# Patient Record
Sex: FEMALE | Race: WHITE | NOT HISPANIC OR LATINO
[De-identification: names, ages, dates, MRNs, and addresses within clinical notes are randomized per-mention and may not be internally consistent; named-entity substitution may affect disease eponyms.]

---

## 2017-11-20 ENCOUNTER — RESULT REVIEW (OUTPATIENT)
Age: 62
End: 2017-11-20

## 2017-11-20 ENCOUNTER — APPOINTMENT (OUTPATIENT)
Dept: OBGYN | Facility: CLINIC | Age: 62
End: 2017-11-20
Payer: COMMERCIAL

## 2017-11-20 PROCEDURE — 99396 PREV VISIT EST AGE 40-64: CPT

## 2018-12-07 ENCOUNTER — RESULT REVIEW (OUTPATIENT)
Age: 63
End: 2018-12-07

## 2018-12-07 ENCOUNTER — APPOINTMENT (OUTPATIENT)
Dept: OBGYN | Facility: CLINIC | Age: 63
End: 2018-12-07
Payer: COMMERCIAL

## 2018-12-07 PROCEDURE — 99396 PREV VISIT EST AGE 40-64: CPT

## 2018-12-07 PROCEDURE — 99213 OFFICE O/P EST LOW 20 MIN: CPT | Mod: 25

## 2019-12-05 ENCOUNTER — APPOINTMENT (OUTPATIENT)
Dept: OBGYN | Facility: CLINIC | Age: 64
End: 2019-12-05
Payer: COMMERCIAL

## 2019-12-05 ENCOUNTER — RESULT REVIEW (OUTPATIENT)
Age: 64
End: 2019-12-05

## 2019-12-05 PROCEDURE — 99396 PREV VISIT EST AGE 40-64: CPT

## 2020-12-31 ENCOUNTER — RESULT REVIEW (OUTPATIENT)
Age: 65
End: 2020-12-31

## 2020-12-31 ENCOUNTER — APPOINTMENT (OUTPATIENT)
Dept: OBGYN | Facility: CLINIC | Age: 65
End: 2020-12-31
Payer: COMMERCIAL

## 2020-12-31 VITALS
DIASTOLIC BLOOD PRESSURE: 78 MMHG | BODY MASS INDEX: 23.3 KG/M2 | WEIGHT: 145 LBS | HEIGHT: 66 IN | SYSTOLIC BLOOD PRESSURE: 124 MMHG

## 2020-12-31 PROCEDURE — 99396 PREV VISIT EST AGE 40-64: CPT

## 2021-11-23 DIAGNOSIS — R92.2 INCONCLUSIVE MAMMOGRAM: ICD-10-CM

## 2021-11-29 ENCOUNTER — NON-APPOINTMENT (OUTPATIENT)
Age: 66
End: 2021-11-29

## 2021-11-29 DIAGNOSIS — Z86.59 PERSONAL HISTORY OF OTHER MENTAL AND BEHAVIORAL DISORDERS: ICD-10-CM

## 2021-11-29 DIAGNOSIS — Z78.9 OTHER SPECIFIED HEALTH STATUS: ICD-10-CM

## 2021-11-29 DIAGNOSIS — Z87.898 PERSONAL HISTORY OF OTHER SPECIFIED CONDITIONS: ICD-10-CM

## 2021-11-29 DIAGNOSIS — M85.80 OTHER SPECIFIED DISORDERS OF BONE DENSITY AND STRUCTURE, UNSPECIFIED SITE: ICD-10-CM

## 2021-11-29 DIAGNOSIS — Z87.19 PERSONAL HISTORY OF OTHER DISEASES OF THE DIGESTIVE SYSTEM: ICD-10-CM

## 2021-11-29 RX ORDER — ALPRAZOLAM 2 MG/1
TABLET ORAL
Refills: 0 | Status: ACTIVE | COMMUNITY

## 2022-02-18 ENCOUNTER — APPOINTMENT (OUTPATIENT)
Dept: OBGYN | Facility: CLINIC | Age: 67
End: 2022-02-18
Payer: MEDICARE

## 2022-02-18 VITALS
BODY MASS INDEX: 23.3 KG/M2 | DIASTOLIC BLOOD PRESSURE: 80 MMHG | WEIGHT: 145 LBS | HEIGHT: 66 IN | SYSTOLIC BLOOD PRESSURE: 120 MMHG

## 2022-02-18 PROCEDURE — G0101: CPT

## 2022-02-18 NOTE — PLAN
[FreeTextEntry1] : 67 y/o  post-menopausal LMP at age 51, annual exam\par \par Annual\par -pap done today\par -BMD f/u pcp for osteo screening\par -breast mammo/sono up to date\par -colon screening reviewed\par -f/u PCP for annual and appropriate immunizations\par -rto 1 year.

## 2022-02-18 NOTE — HISTORY OF PRESENT ILLNESS
[Patient reported PAP Smear was normal] : Patient reported PAP Smear was normal [FreeTextEntry1] : 65 y/o  post-menopausal LMP at age 51, presents today for an annual exam. \par \par ObHx:  1995 x1\par PMHx: osteopenia, anxiety, \par SHx: colonoscopy\par \par \par Mammo : BIRADS 2\par Sono : BIRADS 2\par Pap 2020: normal [Mammogramdate] : 1/22 [TextBox_19] : BIRADS 2 [BreastSonogramDate] : 1/22 [TextBox_25] : BIRADS 2 [PapSmeardate] : 12/20

## 2022-02-18 NOTE — END OF VISIT
[FreeTextEntry3] : I, Ingrid Haines, acted as a scribe on behalf for Dr. Kinsey Donaldson on 02/18/2022.\par \par All medical entries made by the scribe were at my, Dr. Kinsey Donaldson, direction and personally dictated by me on 02/18/2022. I have reviewed the chart and agree that the record accurately reflects my personal performance of the history, physical exam, assessment, and plan. I have personally directed, reviewed, and agreed with the chart.

## 2022-02-28 LAB
CYTOLOGY CVX/VAG DOC THIN PREP: ABNORMAL
HPV HIGH+LOW RISK DNA PNL CVX: NOT DETECTED

## 2022-07-24 ENCOUNTER — NON-APPOINTMENT (OUTPATIENT)
Age: 67
End: 2022-07-24

## 2022-12-02 DIAGNOSIS — Z12.39 ENCOUNTER FOR OTHER SCREENING FOR MALIGNANT NEOPLASM OF BREAST: ICD-10-CM

## 2023-02-27 ENCOUNTER — APPOINTMENT (OUTPATIENT)
Dept: OBGYN | Facility: CLINIC | Age: 68
End: 2023-02-27
Payer: COMMERCIAL

## 2023-02-27 VITALS
HEIGHT: 66 IN | SYSTOLIC BLOOD PRESSURE: 122 MMHG | BODY MASS INDEX: 22.18 KG/M2 | DIASTOLIC BLOOD PRESSURE: 68 MMHG | WEIGHT: 138 LBS

## 2023-02-27 PROCEDURE — 99397 PER PM REEVAL EST PAT 65+ YR: CPT

## 2023-02-27 NOTE — PLAN
[FreeTextEntry1] : HCM\par -SBE\par -pap & HPV today\par -up to date with mammo/colon\par -MVI, Calcium, Vit d\par -Weight/exercise\par -Discussed and reviewed importance of monthly BSE\par -f/u PCP for recommended HCM, vaccinations and CA screening\par RTO 1 year

## 2023-02-27 NOTE — HISTORY OF PRESENT ILLNESS
[FreeTextEntry1] : \par 66yo  presents for routine gyn exam.  No gyn complaints. Recent surgery on eyes (plastics)\par  recently fell and fractured skull but doing better, and he stopped alcohol and now relationship has improved. \par \par Mariella Russo is her daughter\par \par <<<<<Last exam 2022 AV with MG\par Info. from prior EMR:\par Demographics: Race: White Ethnicity: Not  or  Native Language: English Occupation:  [employer] in [place]\par : 5 Para: 1 0 4 1 LMP: 2009 Menopause: Age: 53\par OB History: NSVDx1 \par \par GYN History: No significant GYN history. Sexually Active \par PMH\par No significant past medical history. anxiety, GI upset, Diverticulitis \par Accepts blood products\par Surgical History: No significant surgical history.\par Allergies: Levaquin, PCN\par Current Medications: Prescribed/Suppliments/OTC Xanax PRN\par Medications Verified Medications Verified\par Last PAP: 2018 -- pap / hpv  neg  pt aware  MD 19 Last Mammo: 2019 - Right Mammo - Scattered benign appearing calcifications.  Recommend screening Mammo 1 yr.  VD Last Dexa: 2009 - dexa normal Last Colonoscopy:  - up to date Last Breast Sono:: 2009 - BR SONO BIRAD 0 ADDITIONAL VIEW NEDDED. PT AWARE AND RX FAXED (ND 18)\par Family Hx\par No significant family history\par \par Personal history of blood clots/DVT/PE: no\par Personal history of conditions causing increased risk of blood clots/DVT/PE: no\par Family history of blood clots/DVT/PE: no\par Family history of conditions causing increased risk of blood clots/DVT/PE: no\par \par Social History\par Patient nonsmoker and has no familial exposure to second hand smoke\par Smoker Status: Never smoker\par  [Mammogramdate] : 1/2023 [TextBox_19] : BIRADS 2 [PapSmeardate] : 2/2022 [TextBox_31] : wnl

## 2023-02-27 NOTE — PHYSICAL EXAM
[Chaperone Declined] : Patient declined chaperone [Appropriately responsive] : appropriately responsive [Alert] : alert [No Acute Distress] : no acute distress [Soft] : soft [Non-tender] : non-tender [Non-distended] : non-distended [No Lesions] : no lesions [No Mass] : no mass [Oriented x3] : oriented x3 [Examination Of The Breasts] : a normal appearance [No Masses] : no breast masses were palpable [Labia Majora] : normal [Labia Minora] : normal [Normal] : normal [Uterine Adnexae] : normal [FreeTextEntry4] : Color pink, no distress, [FreeTextEntry5] : Resp. rate wnl, color pink, no SOB

## 2023-03-02 LAB
CYTOLOGY CVX/VAG DOC THIN PREP: ABNORMAL
HPV HIGH+LOW RISK DNA PNL CVX: NOT DETECTED

## 2024-02-28 ENCOUNTER — APPOINTMENT (OUTPATIENT)
Dept: OBGYN | Facility: CLINIC | Age: 69
End: 2024-02-28
Payer: COMMERCIAL

## 2024-02-28 VITALS
BODY MASS INDEX: 23.14 KG/M2 | HEIGHT: 66 IN | WEIGHT: 144 LBS | SYSTOLIC BLOOD PRESSURE: 117 MMHG | DIASTOLIC BLOOD PRESSURE: 79 MMHG

## 2024-02-28 DIAGNOSIS — Z01.419 ENCOUNTER FOR GYNECOLOGICAL EXAMINATION (GENERAL) (ROUTINE) W/OUT ABNORMAL FINDINGS: ICD-10-CM

## 2024-02-28 PROCEDURE — 99397 PER PM REEVAL EST PAT 65+ YR: CPT

## 2024-02-28 NOTE — PHYSICAL EXAM
[Appropriately responsive] : appropriately responsive [No Acute Distress] : no acute distress [Alert] : alert [No Lymphadenopathy] : no lymphadenopathy [Mass] : mass [Soft] : soft [Non-tender] : non-tender [Non-distended] : non-distended [No HSM] : No HSM [No Mass] : no mass [Oriented x3] : oriented x3 [No Lesions] : no lesions [Examination Of The Breasts] : a normal appearance [No Masses] : no breast masses were palpable [Labia Minora] : normal [Labia Majora] : normal [Normal] : normal [Uterine Adnexae] : normal

## 2024-02-28 NOTE — PLAN
[FreeTextEntry1] : 68 year old female pt presents for annual gyn exam Health Maintenance: BSE taught Reviewed diet and exercise Breast and pelvic exam performed Pap/HPV conducted Mammo UTD  Advised pt to see PCP annually   RTO in 1 year or PRN

## 2024-02-28 NOTE — END OF VISIT
[FreeTextEntry2] : This note was written by Keisha Goldstein on 02/28/2024 actively solely MELVIN Ovalle M.D.  All medical record entries made by the Scribe were at my, MELVIN Ovalle M.D. direction and personally dictated by me on 02/28/2024. I have personally reviewed the chart and agree that the record reflects my personal performance of the history, physical exam, assessment, and plan.

## 2024-02-28 NOTE — HISTORY OF PRESENT ILLNESS
[FreeTextEntry1] : 2024. MIHAELA CHOUDHARY 68 year old female  LMP PM , presents for annual gyn exam and offers no complaints. Patient denies VB, abdominal and pelvic pain. No vaginal discharge or vaginitis symptoms. No urinary complaints. BM is normal per patient.   ObHx:  1995 x1  PMHx: osteopenia, anxiety,  SHx: colonoscopy   [PapSmeardate] : 02/23 [Mammogramdate] : 01/24

## 2024-02-29 LAB — HPV HIGH+LOW RISK DNA PNL CVX: NOT DETECTED

## 2024-03-04 LAB — CYTOLOGY CVX/VAG DOC THIN PREP: ABNORMAL

## 2024-04-05 ENCOUNTER — NON-APPOINTMENT (OUTPATIENT)
Age: 69
End: 2024-04-05

## 2024-07-16 ENCOUNTER — NON-APPOINTMENT (OUTPATIENT)
Age: 69
End: 2024-07-16

## 2024-12-20 DIAGNOSIS — Z12.31 ENCOUNTER FOR SCREENING MAMMOGRAM FOR MALIGNANT NEOPLASM OF BREAST: ICD-10-CM

## 2025-02-14 ENCOUNTER — APPOINTMENT (OUTPATIENT)
Dept: OBGYN | Facility: CLINIC | Age: 70
End: 2025-02-14
Payer: COMMERCIAL

## 2025-02-14 VITALS
WEIGHT: 144 LBS | BODY MASS INDEX: 23.14 KG/M2 | DIASTOLIC BLOOD PRESSURE: 73 MMHG | HEIGHT: 66 IN | SYSTOLIC BLOOD PRESSURE: 125 MMHG

## 2025-02-14 DIAGNOSIS — Z01.419 ENCOUNTER FOR GYNECOLOGICAL EXAMINATION (GENERAL) (ROUTINE) W/OUT ABNORMAL FINDINGS: ICD-10-CM

## 2025-02-14 PROCEDURE — 99397 PER PM REEVAL EST PAT 65+ YR: CPT

## 2025-02-18 LAB — HPV HIGH+LOW RISK DNA PNL CVX: NOT DETECTED

## 2025-02-20 LAB — CYTOLOGY CVX/VAG DOC THIN PREP: ABNORMAL

## 2025-04-19 ENCOUNTER — NON-APPOINTMENT (OUTPATIENT)
Age: 70
End: 2025-04-19